# Patient Record
Sex: MALE | Race: WHITE | NOT HISPANIC OR LATINO | Employment: STUDENT | URBAN - METROPOLITAN AREA
[De-identification: names, ages, dates, MRNs, and addresses within clinical notes are randomized per-mention and may not be internally consistent; named-entity substitution may affect disease eponyms.]

---

## 2023-08-31 ENCOUNTER — OFFICE VISIT (OUTPATIENT)
Age: 23
End: 2023-08-31
Payer: COMMERCIAL

## 2023-08-31 DIAGNOSIS — T14.8XXA MUSCLE STRAIN: ICD-10-CM

## 2023-08-31 DIAGNOSIS — M62.81 MUSCLE WEAKNESS OF LEFT UPPER EXTREMITY: ICD-10-CM

## 2023-08-31 DIAGNOSIS — R29.3 POOR POSTURE: ICD-10-CM

## 2023-08-31 DIAGNOSIS — M25.512 ACUTE PAIN OF LEFT SHOULDER: Primary | ICD-10-CM

## 2023-08-31 PROCEDURE — 97110 THERAPEUTIC EXERCISES: CPT

## 2023-08-31 PROCEDURE — 97161 PT EVAL LOW COMPLEX 20 MIN: CPT

## 2023-08-31 PROCEDURE — 97530 THERAPEUTIC ACTIVITIES: CPT

## 2023-08-31 NOTE — PROGRESS NOTES
PT Evaluation     Today's date: 2023  Patient name: Ganesh Arvizu  : 2000  MRN: [id number]  Referring provider: Fraknlin Carter PT  Dx:   Encounter Diagnosis     ICD-10-CM    1. Acute pain of left shoulder  M25.512       2. Muscle strain  T14. 8XXA       3. Poor posture  R29.3       4. Muscle weakness of left upper extremity  M62.81           Start Time: 0945  Stop Time: 1045  Total time in clinic (min): 60 minutes    Assessment  Assessment details: Ganesh Arvizu is a 25y.o. year old male who presents to outpatient physical therapy with a primary diagnosis of acute left shoulder pain. 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 They present with the previously stated deficits which limit their ability to participate in recreational activities. Pt is a highly active young adult who is unable to participate in normal level of activity due to shoulder pain and weakness. He has no red flag symptoms or symptoms indicating need for radiographs. He does have very rounded shoulders, forward head and thoracic kyphosis at rest, which puts his scapula at a biomechanical disadvantage. He is highly motivated to return to PLOF. Anna Frost is currently functioning below their prior level of function and is a good rehab candidate who would benefit from skilled outpatient physical therapy services to allow them to reach their goals and return to PLOF, return to recreational activities, participate more fully in daily activities and have an improved quality of life. Physical therapist assistant (PTA) may be utilized to administer treatments as appropriate and in accordance with [address] [place] [address] [place].     Impairments: abnormal muscle tone, abnormal or restricted ROM, abnormal movement, activity intolerance, impaired physical strength, lacks appropriate home exercise program, pain with function and poor posture   Barriers to therapy: None   Understanding of Dx/Px/POC: good   Prognosis: good    Goals  STG to be completed in 2 weeks from 8/31/2023:   1. Starr Brunner will be independent with basic HEP to allow patient to complete exercises safely when not directly supervised during PT session.   2. Starr Brunner will report pain no worse than 5/10 at worst to indicate decreased pain level and improved tolerance to activity. 3. Starr Brunner will increase left shoulder AROM flexion and abduction to 170* with no increase in pain to allow patient to participate in functional tasks such as BADLs, overhead tasks, and functional mobility.         LTG to be completed in 5 weeks from 8/31/2023  or upon discharge from PT. 1. Starr Brunner will be independent with advanced home exercise program to allow patient to transition from physical therapy care to continuing with plan of care at home without supervision from therapist and continue to progress with rehabilitation. 2. Starr Brunner will report pain no worse than 2/10 at worst to indicate decreased pain level and improved tolerance to activity  3. Starr Brunner will demonstrate gross 4+/5 strength in left UE with no increase in pain for improved stability of joint and indicate improvement in functional strength.    4. Starr Brunner will report 75% improvement in symptoms compared to start of POC to indicate functional improvement and decrease level of disability.   5. Starr Brunner will return to sports with no limitations.        Plan  Patient would benefit from: skilled physical therapy  Planned modality interventions: biofeedback, cryotherapy, TENS and thermotherapy: hydrocollator packs  Planned therapy interventions: abdominal trunk stabilization, activity modification, ADL retraining, ADL training, balance, balance/weight bearing training, behavior modification, body mechanics training, breathing training, coordination, flexibility, functional ROM exercises, gait training, graded activity, graded exercise, graded motor, home exercise program, IADL retraining, IASTM, joint mobilization, kinesiology taping, manual therapy, massage, Holliday taping, muscle pump exercises, nerve gliding, patient education, postural training, strengthening, stretching, therapeutic activities and therapeutic exercise  Frequency: 2x week  Duration in weeks: 5  Plan of Care beginning date: 2023  Plan of Care expiration date: 10/5/2023  Treatment plan discussed with: patient, PTA and referring physician        Subjective Evaluation    History of Present Illness  Date of onset: 2023  Mechanism of injury: Stephan Vasquez is a 25 y.o. male. They present to outpatient physical therapy with the primary complaint of left shoulder pain. They are self-referred to OPPT. Symptoms began 2023. Amparo Chavez reports he went to make a tackle with his left shoulder. He had no pop or snap but just had pain. He was able to keep playing for a bit but then it felt weak. He cant sleep on the left side. He has not been using any ice, heat or medication. He did have surgery on right "key hole" for [facility] joint. He has his first rugby game this weekend.      Patient Goals  Patient goals for therapy: decreased pain, increased motion, increased strength and return to sport/leisure activities  Patient goal: "get back to playing Rugby, increase movement, reduce pain"   Pain  Current pain ratin  At best pain ratin  At worst pain ratin (lying in bed and rolling onto shoulder )  Location: left posterior shoulder and AC joint   Quality: dull ache  Relieving factors: rest  Exacerbated by: lying on left side, moving arm around     Social Support  Steps to enter house: yes  Stairs in house: yes   Lives in: multiple-level home  Lives with: roomates     Employment status: not working  Hand dominance: right  Exercise history: Joni Alemannison       Diagnostic Tests  No diagnostic tests performed  Treatments  No previous or current treatments        Objective     Static Posture     Head  Forward. Shoulders  Asymmetric shoulders and rounded. Palpation   Left   No palpable tenderness to the biceps, infraspinatus, latissimus, middle deltoid, middle trapezius, pectoralis minor, posterior deltoid, serratus anterior, supraspinatus, teres major, teres minor and triceps. Hypertonic in the middle trapezius. Active Range of Motion   Left Shoulder   Flexion: 110 degrees with pain  Extension: WFL  Abduction: 102 degrees with pain    Additional Active Range of Motion Details  Left Functional IR WNL non painful  Left Functional ER to mid C spine painful (mod limited compared to right)    Strength/Myotome Testing     Left Shoulder     Planes of Motion   Flexion: 3+ (pain )   Abduction: 4- (mild pain)   External rotation at 0°: 5   Internal rotation at 0°: 5     Isolated Muscles   Biceps: 5   Triceps: 5     Right Shoulder     Planes of Motion   Flexion: 5   Abduction: 5   External rotation at 0°: 5   Internal rotation at 0°: 5     Isolated Muscles   Biceps: 5   Triceps: 5     Tests     Left Shoulder   Positive empty can, full can and Hawkin's. Negative apprehension, belly press, crossover, drop arm, laxity (step off), lift-off, painful arc and sulcus sign. Flowsheet Rows    Flowsheet Row Most Recent Value   PT/OT G-Codes    Current Score 31   Projected Score 0             Precautions: standard,     Access Code: [email or web address]  URL: [email or web address]  Date: 08/31/2023  Prepared by: Palomo Alaniz    Exercises  - Seated Cervical Sidebending Stretch  - 2 x daily - 1 sets - 3 reps - 30 second hold  - Doorway Pec Stretch at 90 Degrees Abduction  - 2 x daily - 1 sets - 3 reps - 30 second hold  - Standing Row with Anchored Resistance  - 1 x daily - 1 sets - 20 reps - 3 second  hold  - Shoulder External Rotation with Anchored Resistance with Towel Under Elbow  - 2 x daily - 2 sets - 10 reps  - Shoulder Internal Rotation with Resistance - 2 x daily - 2 sets - 10 reps      Date: 8/31/2023             Visit: #1 #2 #3 #4 #5 #6 #7 #8 #9 #10   Manual:             t spine P-A  IASTM UT  PROM as needed L shoulder              Patient Education: Posture                                       Neuro Re-Ed             TB row             TB ext             TB IR -> at 90* abd              TB ER -> at 90* abd                                                     Ther Ex             Warm Up  UBE           Cat cow                                                                                            Ther Activity                                       Gait Training                                       Modalities

## 2023-09-04 NOTE — PROGRESS NOTES
Daily Note     Today's date: 2023  Patient name: Apple Grief  : 2000  MRN: [id number]  Referring provider: Palomo Alaniz PT  Dx:   Encounter Diagnosis     ICD-10-CM    1. Acute pain of left shoulder  M25.512       2. Muscle strain  T14. 8XXA       3. Poor posture  R29.3       4. Muscle weakness of left upper extremity  M62.81           Start Time: 0730  Stop Time: 0815  Total time in clinic (min): 45 minutes    Subjective: Pt reports shoulder is feeling ok today. He did some training last night so was a little tight. He is not playing in the match this weekend. Overall shoulder is improving. Objective: See treatment diary below      Assessment: Apple Grdelfina tolerated today's treatment session well. Patient education provided on progression of stability shoulder TE and postural correction exercises. Misti Vang completed all TE with good form and no adverse reactions. Pt does have hypomubile segments in thoracic spine which improved with grade 3-4 mobilizations. He was challanged with table top position exercises. Misti Vang continues to benefit from skilled OPPT services to address left shoulder pain. Will continue to address functional deficits and focus on progression of POC per patient tolerance. Patient performed UBE to increase blood flow to the area being treated, prepare the muscles for strength training and stretching, improve overall tolerance to activity, and aerobic endurance. Plan: Continue per plan of care. Progress treatment as tolerated. Precautions: standard,     Access Code: [email or web address]  URL: [email or web address]  Date: 2023  Prepared by: Palomo Alaniz    Exercises  - Seated Cervical Sidebending Stretch  - 2 x daily - 1 sets - 3 reps - 30 second hold  - Doorway Pec Stretch at 90 Degrees Abduction  - 2 x daily - 1 sets - 3 reps - 30 second hold  - Standing Row with Anchored Resistance  - 1 x daily - 1 sets - 20 reps - 3 second  hold  - Shoulder External Rotation with Anchored Resistance with Towel Under Elbow  - 2 x daily - 2 sets - 10 reps  - Shoulder Internal Rotation with Resistance  - 2 x daily - 2 sets - 10 reps  - Supine Scapular Protraction  - 2 x daily - 1 sets - 20 reps  - Wall Push Up with Plus  - 2 x daily - 1 sets - 10-20 reps  - Cat Cow  - 2 x daily - 1 sets - 10-20 reps  - Shoulder Internal Rotation in Abduction with Resistance  - 2 x daily - 1 sets - 10-20 reps  - Standing Single Arm Shoulder External Rotation in Abduction with Anchored Resistance  - 2 x daily - 1 sets - 10-20 reps  - Quadruped Alternating Arm Lift  - 2 x daily - 1 sets - 10-20 reps      Date: 8/31/2023 9/5/2023           Visit: #1 #2 #3 #4 #5 #6 #7 #8 #9 #10   Manual:             t spine P-A  IASTM UT  PROM as needed L shoulder   8 min [address] [facility]            Patient Education: Posture                                       Neuro Re-Ed             TB row  GTB x20            TB ext  GTB x20            TB IR -> at 90* abd   At 90* abd x20 RTB           TB ER -> at 90* abd   At 90* abd x20 RTB           Ball on wall   2# ball 4 directions x20 each            Body blade  2x30"                        Ther Ex             Warm Up  UBE level 1 4'/4'           Cat cow   x15            Quadruped alt UE  x10            Supine serratus punch   x20 each UE            Wall push up puls  x10           Doorway pec stretch   30" x3           TRX Row  x20                         Ther Activity                                       Gait Training                                       Modalities

## 2023-09-05 ENCOUNTER — OFFICE VISIT (OUTPATIENT)
Age: 23
End: 2023-09-05
Payer: COMMERCIAL

## 2023-09-05 DIAGNOSIS — M25.512 ACUTE PAIN OF LEFT SHOULDER: Primary | ICD-10-CM

## 2023-09-05 DIAGNOSIS — M62.81 MUSCLE WEAKNESS OF LEFT UPPER EXTREMITY: ICD-10-CM

## 2023-09-05 DIAGNOSIS — R29.3 POOR POSTURE: ICD-10-CM

## 2023-09-05 DIAGNOSIS — T14.8XXA MUSCLE STRAIN: ICD-10-CM

## 2023-09-05 PROCEDURE — 97110 THERAPEUTIC EXERCISES: CPT

## 2023-09-05 PROCEDURE — 97140 MANUAL THERAPY 1/> REGIONS: CPT

## 2023-09-05 PROCEDURE — 97112 NEUROMUSCULAR REEDUCATION: CPT

## 2023-09-12 ENCOUNTER — OFFICE VISIT (OUTPATIENT)
Age: 23
End: 2023-09-12
Payer: COMMERCIAL

## 2023-09-12 DIAGNOSIS — M62.81 MUSCLE WEAKNESS OF LEFT UPPER EXTREMITY: ICD-10-CM

## 2023-09-12 DIAGNOSIS — R29.3 POOR POSTURE: ICD-10-CM

## 2023-09-12 DIAGNOSIS — T14.8XXA MUSCLE STRAIN: ICD-10-CM

## 2023-09-12 DIAGNOSIS — M25.512 ACUTE PAIN OF LEFT SHOULDER: Primary | ICD-10-CM

## 2023-09-12 PROCEDURE — 97112 NEUROMUSCULAR REEDUCATION: CPT

## 2023-09-12 PROCEDURE — 97110 THERAPEUTIC EXERCISES: CPT

## 2023-09-13 NOTE — PROGRESS NOTES
Daily Note     Today's date: 2023  Patient name: Kimmie Sultana  : 2000  MRN: [id number]  Referring provider: Vladimir Perez, PT  Dx:   Encounter Diagnosis     ICD-10-CM    1. Acute pain of left shoulder  M25.512       2. Muscle weakness of left upper extremity  M62.81       3. Muscle strain  T14. 8XXA       4. Poor posture  R29.3                      Subjective: ***      Objective: See treatment diary below      Assessment: Kimmie Sultana tolerated today's treatment session well. Patient education provided on [facility] education options:[id number]}. Jacob Jonas completed all TE with good form and no adverse reactions. ***  Jacob Jonas continues to benefit from skilled OPPT services to address {daMer[facility] Medical Centersytoms:76487}. Will continue to address functional deficits and focus on progression of POC per patient tolerance. Patient performed {KUAerobic:45140} to increase blood flow to the area being treated, prepare the muscles for strength training and stretching, improve overall tolerance to activity, and aerobic endurance. Plan: Continue per plan of care. Progress treatment as tolerated. Precautions: standard,     Access Code: [email or web address]  URL: [email or web address]  Date: 2023  Prepared by: Vladimir Perez    Exercises  - Seated Cervical Sidebending Stretch  - 2 x daily - 1 sets - 3 reps - 30 second hold  - Doorway Pec Stretch at 90 Degrees Abduction  - 2 x daily - 1 sets - 3 reps - 30 second hold  - Standing Row with Anchored Resistance  - 1 x daily - 1 sets - 20 reps - 3 second  hold  - Shoulder External Rotation with Anchored Resistance with Towel Under Elbow  - 2 x daily - 2 sets - 10 reps  - Shoulder Internal Rotation with Resistance  - 2 x daily - 2 sets - 10 reps  - Supine Scapular Protraction  - 2 x daily - 1 sets - 20 reps  - Wall Push Up with Plus  - 2 x daily - 1 sets - 10-20 reps  - Cat Cow  - 2 x daily - 1 sets - 10-20 reps  - Shoulder Internal Rotation in Abduction with Resistance  - 2 x daily - 1 sets - 10-20 reps  - Standing Single Arm Shoulder External Rotation in Abduction with Anchored Resistance  - 2 x daily - 1 sets - 10-20 reps  - Quadruped Alternating Arm Lift  - 2 x daily - 1 sets - 10-20 reps      Date: 8/31/2023  9/5/2023 9/12/2023 9/15/2023         Visit: #1 #2 #3 #4 #5 #6 #7 #8 #9 #10   Manual:             t spine P-A  IASTM UT  PROM as needed L shoulder   8 min KH  Theragun to UTs 5 min           Patient Education: Posture                                       Neuro Re-Ed             TB row  GTB x20  keiko 15 PSI x20          TB ext  GTB x20            TB IR -> at 90* abd   At 90* abd x20 RTB           TB ER -> at 90* abd   At 90* abd x20 RTB           Ball on wall   2# ball 4 directions x20 each            Body blade  2x30"           PNF   4PSI [place] x20          Overhead ball toss    Red ball 1 min           Wall walk    Pink TB x10                                                 Ther Ex             Warm Up  UBE level 1 4'/4' UBE level 3 4'/4'          Cat cow   x15            Quadruped alt UE  x10  In high plank n table x10 each arm           Supine serratus punch   x20 each UE  3# weights x20 BUE           Wall push up puls  x10           Doorway pec stretch   30" x3 30" x3           TRX Row  x20            Prone I, T, Y   T x20  Y x20  I x20          No money    GTB x20                                    Ther Activity                                       Gait Training                                       Modalities

## 2023-09-15 ENCOUNTER — APPOINTMENT (OUTPATIENT)
Age: 23
End: 2023-09-15
Payer: COMMERCIAL

## 2023-09-15 DIAGNOSIS — M62.81 MUSCLE WEAKNESS OF LEFT UPPER EXTREMITY: ICD-10-CM

## 2023-09-15 DIAGNOSIS — R29.3 POOR POSTURE: ICD-10-CM

## 2023-09-15 DIAGNOSIS — M25.512 ACUTE PAIN OF LEFT SHOULDER: Primary | ICD-10-CM

## 2023-09-15 DIAGNOSIS — T14.8XXA MUSCLE STRAIN: ICD-10-CM

## 2023-09-15 NOTE — PROGRESS NOTES
Daily Note     Today's date: 2023  Patient name: Ishan Garrison  : 2000  MRN: [id number]  Referring provider: Andrew Horne, PT  Dx:   Encounter Diagnosis     ICD-10-CM    1. Acute pain of left shoulder  M25.512       2. Muscle strain  T14. 8XXA       3. Poor posture  R29.3       4. Muscle weakness of left upper extremity  M62.81           Start Time: 0900  Stop Time: 45  Total time in clinic (min): 45 minutes    Subjective: Pt reports he has been practicing with  team and is able to do most things with minimal pain. Overall feels he is improving. Objective: See treatment diary below      Assessment: Ishan Garrison tolerated today's treatment session well. Patient education provided on progression of shoulder strengthening exercises and HEP. Ava Ponce completed all TE with good form and no adverse reactions. Pt was challenged with WBing exercises in high plank position, most notable with push up plus. He was provided an updated progressed HEP and blue and purple bands for continued progression at home. Ava Ponce continues to benefit from skilled OPPT services to address shoulder pain. Will continue to address functional deficits and focus on progression of POC per patient tolerance. Patient performed UBE to increase blood flow to the area being treated, prepare the muscles for strength training and stretching, improve overall tolerance to activity, and aerobic endurance. Plan: Continue per plan of care. Progress treatment as tolerated. Precautions: standard,     Access Code: [email or web address]  URL: [email or web address]  Date: 2023  Prepared by: Andrew Horne    Exercises  - Seated Cervical Sidebending Stretch  - 2 x daily - 1 sets - 3 reps - 30 second hold  - Doorway Pec Stretch at 90 Degrees Abduction  - 2 x daily - 1 sets - 3 reps - 30 second hold  - Standing Row with Anchored Resistance  - 1 x daily - 1 sets - 20 reps - 3 second  hold  - Shoulder External Rotation with Anchored Resistance with Towel Under Elbow  - 2 x daily - 2 sets - 10 reps  - Shoulder Internal Rotation with Resistance  - 2 x daily - 2 sets - 10 reps  - Supine Scapular Protraction  - 2 x daily - 1 sets - 20 reps  - Wall Push Up with Plus  - 2 x daily - 1 sets - 10-20 reps  - Cat Cow  - 2 x daily - 1 sets - 10-20 reps  - Shoulder Internal Rotation in Abduction with Resistance  - 2 x daily - 1 sets - 10-20 reps  - Standing Single Arm Shoulder External Rotation in Abduction with Anchored Resistance  - 2 x daily - 1 sets - 10-20 reps  - Quadruped Alternating Arm Lift  - 2 x daily - 1 sets - 10-20 reps  - Prone Scapular Retraction Arms at Side  - 2 x daily - 2 sets - 10 reps  - Prone Single Arm Shoulder Y  - 2 x daily - 2 sets - 10 reps  - Modified Superman on Table  - 2 x daily - 2 sets - 10 reps  - Face Pulls  - 2 x daily - 2 sets - 10 reps  - No Money's   - 2 x daily - 2 sets - 10 reps  - Plank  - 2 x daily - 2 sets - 3 reps - 30-60 second  hold  - Shoulder PNF D2 with Resistance  - 2 x daily - 2 sets - 10 reps  - Push Up with Plus  - 2 x daily - 2 sets - 10 reps      Date: 8/31/2023  9/5/2023 9/12/2023 9/15/2023         Visit: #1 #2 #3 #4 #5 #6 #7 #8 #9 #10   Manual:             t spine P-A  IASTM UT  PROM as needed L shoulder   8 min KH  Theragun to UTs 5 min           Patient Education: Posture                                       Neuro Re-Ed             TB row  GTB x20  keiko 15 PSI x20          TB ext  GTB x20            TB IR -> at 90* abd   At 90* abd x20 RTB           TB ER -> at 90* abd   At 90* abd x20 RTB           Ball on wall   2# ball 4 directions x20 each   2# ball 4 directions x20 each          Body blade  2x30"           PNF   4PSI [place] x20 Pink TB x15         Overhead ball toss    Red ball 1 min  Red ball 1 min          Wall walk    Pink TB x10 Pink TB x10         High plank     30"  Step ups to 6" box x10         Full push up plus     x10                      Ther Ex Warm Up  UBE level 1 4'/4' UBE level 3 4'/4' UBE level 2 4'/4'         Cat cow   x15            Quadruped alt UE  x10  In high plank n table x10 each arm           Supine serratus punch   x20 each UE  3# weights x20 BUE  Standing CO Yellow 10# x20          Wall push up puls  x10           Doorway pec stretch   30" x3 30" x3           TRX Row  x20            Prone I, T, Y   T x20  Y x20  I x20 T x20  Y x20  I x20         No money    GTB x20 GTB x20          Face pulls    CO Yellow 10# x20                       Ther Activity                                       Gait Training                                       Modalities

## 2023-09-20 ENCOUNTER — OFFICE VISIT (OUTPATIENT)
Age: 23
End: 2023-09-20
Payer: COMMERCIAL

## 2023-09-20 DIAGNOSIS — M25.512 ACUTE PAIN OF LEFT SHOULDER: Primary | ICD-10-CM

## 2023-09-20 DIAGNOSIS — R29.3 POOR POSTURE: ICD-10-CM

## 2023-09-20 DIAGNOSIS — M62.81 MUSCLE WEAKNESS OF LEFT UPPER EXTREMITY: ICD-10-CM

## 2023-09-20 DIAGNOSIS — T14.8XXA MUSCLE STRAIN: ICD-10-CM

## 2023-09-20 PROCEDURE — 97110 THERAPEUTIC EXERCISES: CPT

## 2023-09-20 PROCEDURE — 97112 NEUROMUSCULAR REEDUCATION: CPT

## 2024-10-28 NOTE — PROGRESS NOTES
Daily Note     Today's date: 2023  Patient name: Michelet Wilks  : 2000  MRN: [id number]  Referring provider: Armin Barajas, PT  Dx:   Encounter Diagnosis     ICD-10-CM    1. Acute pain of left shoulder  M25.512       2. Muscle strain  T14. 8XXA       3. Poor posture  R29.3       4. Muscle weakness of left upper extremity  M62.81           Start Time: 1200  Stop Time: 1245  Total time in clinic (min): 45 minutes    Subjective: Pt reports that he was at practice he tried to lift a teammate a drill and then had pain in shoulder. Objective: See treatment diary below      Assessment: Michelet Wilks tolerated today's treatment session well. Patient education provided on progression of POC and importance of pain free strengtening and postural control/awareness. Estefania Gustafson completed all TE with good form and no adverse reactions. Patient was challenged with prone I, T, Y exercises as well as wall walks. He admits he does not feel ready to return to playing rugby for game this coming week. Estefania Gustafson continues to benefit from skilled OPPT services to address shoulder pain. Will continue to address functional deficits and focus on progression of POC per patient tolerance. Patient performed UBE to increase blood flow to the area being treated, prepare the muscles for strength training and stretching, improve overall tolerance to activity, and aerobic endurance. Plan: Continue per plan of care. Progress treatment as tolerated. Precautions: standard,     Access Code: [email or web address]  URL: [email or web address]  Date: 2023  Prepared by: Armin Barajas    Exercises  - Seated Cervical Sidebending Stretch  - 2 x daily - 1 sets - 3 reps - 30 second hold  - Doorway Pec Stretch at 90 Degrees Abduction  - 2 x daily - 1 sets - 3 reps - 30 second hold  - Standing Row with Anchored Resistance  - 1 x daily - 1 sets - 20 reps - 3 second  hold  - Shoulder External Rotation with Anchored Resistance with Towel Under Elbow  - 2 x daily - 2 sets - 10 reps  - Shoulder Internal Rotation with Resistance  - 2 x daily - 2 sets - 10 reps  - Supine Scapular Protraction  - 2 x daily - 1 sets - 20 reps  - Wall Push Up with Plus  - 2 x daily - 1 sets - 10-20 reps  - Cat Cow  - 2 x daily - 1 sets - 10-20 reps  - Shoulder Internal Rotation in Abduction with Resistance  - 2 x daily - 1 sets - 10-20 reps  - Standing Single Arm Shoulder External Rotation in Abduction with Anchored Resistance  - 2 x daily - 1 sets - 10-20 reps  - Quadruped Alternating Arm Lift  - 2 x daily - 1 sets - 10-20 reps      Date: 8/31/2023 9/5/2023 9/12/2023          Visit: #1 #2 #3 #4 #5 #6 #7 #8 #9 #10   Manual:             t spine P-A  IASTM UT  PROM as needed L shoulder   8 min KH  Theragun to UTs 5 min           Patient Education: Posture                                       Neuro Re-Ed             TB row  GTB x20  patel 15 PSI x20          TB ext  GTB x20            TB IR -> at 90* abd   At 90* abd x20 RTB           TB ER -> at 90* abd   At 90* abd x20 RTB           Ball on wall   2# ball 4 directions x20 each            Body blade  2x30"           PNF   4PSI [place] x20          Overhead ball toss    Red ball 1 min           Wall walk    Pink TB x10                                                 Ther Ex             Warm Up  UBE level 1 4'/4' UBE level 3 4'/4'          Cat cow   x15            Quadruped alt UE  x10  In high plank n table x10 each arm           Supine serratus punch   x20 each UE  3# weights x20 BUE           Wall push up puls  x10           Doorway pec stretch   30" x3 30" x3           TRX Row  x20            Prone I, T, Y   T x20  Y x20  I x20          No money    GTB x20                                    Ther Activity                                       Gait Training                                       Modalities 116

## 2024-11-12 ENCOUNTER — ATHLETIC TRAINING (OUTPATIENT)
Dept: SPORTS MEDICINE | Facility: OTHER | Age: 24
End: 2024-11-12

## 2024-11-12 ENCOUNTER — APPOINTMENT (OUTPATIENT)
Age: 24
End: 2024-11-12
Payer: COMMERCIAL

## 2024-11-12 ENCOUNTER — OFFICE VISIT (OUTPATIENT)
Age: 24
End: 2024-11-12
Payer: COMMERCIAL

## 2024-11-12 VITALS
OXYGEN SATURATION: 98 % | DIASTOLIC BLOOD PRESSURE: 80 MMHG | WEIGHT: 212.08 LBS | BODY MASS INDEX: 25.83 KG/M2 | RESPIRATION RATE: 17 BRPM | HEART RATE: 61 BPM | HEIGHT: 76 IN | SYSTOLIC BLOOD PRESSURE: 122 MMHG | TEMPERATURE: 98.7 F

## 2024-11-12 DIAGNOSIS — S66.812A STRAIN OF MCP JOINT, LEFT, INITIAL ENCOUNTER: Primary | ICD-10-CM

## 2024-11-12 DIAGNOSIS — M79.642 PAIN OF LEFT HAND: ICD-10-CM

## 2024-11-12 DIAGNOSIS — S63.682A SPRAIN OF OTHER SITE OF LEFT THUMB, INITIAL ENCOUNTER: Primary | ICD-10-CM

## 2024-11-12 PROCEDURE — 73130 X-RAY EXAM OF HAND: CPT

## 2024-11-12 PROCEDURE — S9083 URGENT CARE CENTER GLOBAL: HCPCS

## 2024-11-12 PROCEDURE — G0382 LEV 3 HOSP TYPE B ED VISIT: HCPCS

## 2024-11-12 NOTE — PATIENT INSTRUCTIONS
Preliminary reading of left hand X-ray: No acute fracture on my interpretation.  Radiologist will have final reading- if that is different I will call you.    Heat to affected area: 15 minutes every 3 hours as needed throughout the day  Topical pain medication such as icy/hot, Biofreeze, Salon pas, etc.  Ibuprofen - 600 mg orally every 6-8 hours when required, maximum 2400 mg/day   Acetaminophen - 650 mg orally every 4-6 hours when required, maximum 3000 mg/day    Since pain has been ongoing for 3 weeks, will have you follow up with orthopedics- Call [phone] to make an appointment    If tests have been performed at [facility], our office will contact you with results if changes need to be made to the care plan discussed with you at the visit.  You can review your full results on [facility] MyChart.

## 2024-11-12 NOTE — PROGRESS NOTES
[facility]        NAME: Derick Osullivan is a 23 y.o. male  : 2000    MRN: [id number]  DATE: 2024  TIME: 11:02 AM    Assessment and Plan   Sprain of other site of left thumb, initial encounter [S63.682A]  1. Sprain of other site of left thumb, initial encounter  XR hand 3+ vw left    Ambulatory Referral to Orthopedic Surgery            Patient Instructions   Preliminary reading of left hand X-ray: No acute fracture on my interpretation.  Radiologist will have final reading- if that is different I will call you.    Heat to affected area: 15 minutes every 3 hours as needed throughout the day  Topical pain medication such as icy/hot, Biofreeze, Salon pas, etc.  Ibuprofen - 600 mg orally every 6-8 hours when required, maximum 2400 mg/day   Acetaminophen - 650 mg orally every 4-6 hours when required, maximum 3000 mg/day    Since pain has been ongoing for 3 weeks, will have you follow up with orthopedics- Call [phone] to make an appointment    If tests have been performed at [facility], our office will contact you with results if changes need to be made to the care plan discussed with you at the visit.  You can review your full results on [facility] MyChart.    Chief Complaint     Chief Complaint   Patient presents with    Hand Injury     Plays rugby at [employer]. Had injury to LEFT thumb and hand about 3 weeks ago. Sent here by AT for x-ray of hand.          History of Present Illness       Pain at the base of the left thumb starting about 3 weeks ago.  States he does play rugby and unsure of any specific injury that had started this.  States pain with movement such as flexion or extension of the left thumb.  He does report there is pain on palpation to the first metacarpal of the left hand.  He has been icing and avoiding movements that can make the pain worse.    Hand Injury   Pertinent negatives include no chest pain.       Review of Systems   Review of Systems   Constitutional:   "Negative for chills and fever.   Eyes:  Negative for pain and visual disturbance.   Respiratory:  Negative for cough and shortness of breath.    Cardiovascular:  Negative for chest pain and palpitations.   Gastrointestinal:  Negative for abdominal pain and vomiting.   Musculoskeletal:  Positive for myalgias. Negative for arthralgias, back pain and joint swelling.        Base of left thumb   Skin:  Negative for color change and rash.   Neurological:  Negative for dizziness, seizures, syncope, weakness and light-headedness.   All other systems reviewed and are negative.        Current Medications     No current outpatient medications on file.    Current Allergies     Allergies as of 11/12/2024    (No Known Allergies)            The following portions of the patient's history were reviewed and updated as appropriate: allergies, current medications, past family history, past medical history, past social history, past surgical history and problem list.     History reviewed. No pertinent past medical history.    Past Surgical History:   Procedure Laterality Date    SHOULDER SURGERY Right     THROAT SURGERY      espohageal fistula repair       History reviewed. No pertinent family history.      Medications have been verified.        Objective   /80 (BP Location: Left arm, Patient Position: Sitting, Cuff Size: Standard)   Pulse 61   Temp 98.7 °F (37.1 °C) (Oral)   Resp 17   Ht 6' 4\" (1.93 m)   Wt 96.2 kg (212 lb 1.3 oz)   SpO2 98%   BMI 25.82 kg/m²   No LMP for male patient.       Physical Exam     Physical Exam  Vitals and nursing note reviewed.   Constitutional:       Appearance: Normal appearance.   HENT:      Head: Normocephalic and atraumatic.   Pulmonary:      Effort: Pulmonary effort is normal.   Musculoskeletal:        Hands:    Skin:     General: Skin is warm and dry.      Capillary Refill: Capillary refill takes less than 2 seconds.   Neurological:      General: No focal deficit present.      Mental " Status: He is alert and oriented to person, place, and time. Mental status is at baseline.      Sensory: No sensory deficit.      Motor: No weakness.   Psychiatric:         Mood and Affect: Mood normal.         Behavior: Behavior normal.         Thought Content: Thought content normal.

## 2024-11-14 ENCOUNTER — OFFICE VISIT (OUTPATIENT)
Age: 24
End: 2024-11-14
Payer: COMMERCIAL

## 2024-11-14 VITALS
BODY MASS INDEX: 25.94 KG/M2 | WEIGHT: 213 LBS | SYSTOLIC BLOOD PRESSURE: 122 MMHG | HEIGHT: 76 IN | HEART RATE: 60 BPM | DIASTOLIC BLOOD PRESSURE: 80 MMHG

## 2024-11-14 DIAGNOSIS — S69.92XA INJURY OF LEFT THUMB, INITIAL ENCOUNTER: ICD-10-CM

## 2024-11-14 DIAGNOSIS — S66.412A STRAIN OF INTRINSIC MUSCLE OF LEFT THUMB: ICD-10-CM

## 2024-11-14 DIAGNOSIS — S63.642A SPRAIN OF METACARPOPHALANGEAL (MCP) JOINT OF LEFT THUMB, INITIAL ENCOUNTER: Primary | ICD-10-CM

## 2024-11-14 PROCEDURE — 99203 OFFICE O/P NEW LOW 30 MIN: CPT | Performed by: STUDENT IN AN ORGANIZED HEALTH CARE EDUCATION/TRAINING PROGRAM

## 2024-11-14 NOTE — PROGRESS NOTES
1. Sprain of metacarpophalangeal (MCP) joint of left thumb, initial encounter  Durable Medical Equipment      2. Injury of left thumb, initial encounter  Durable Medical Equipment        Orders Placed This Encounter   Procedures    Durable Medical Equipment        Imaging Studies (I personally reviewed images in PACS and report):    X-ray left hand 11/12/2024: No acute osseous abnormalities.  No significant degenerative changes.  Tiny punctate radiodensity along radial aspect of the second PIP only seen on AP view and similar radiodensity of the distal third digit on the lateral.  This is felt to be artifact.    IMPRESSION:  Acute left thumb MCP pain/injury of an unspecified mechanism-patient participates in rugby and thinks something occurred during the game as he noticed afterwards he was experiencing severe pain at the MCP aspect of his thumb as well as diffuse swelling  Since incident patient reports difficulty with gripping, lifting strenuously.  Pain is currently aggravated during rugby along with swelling afterwards.  Radiographic imaging unremarkable  Suspected MCP sprain of his thumb either of the UCL or RCL.  Differential includes myofascial strain around the MCP of his thumb as well  Date of Injury: Approximately 3 weeks ago    School: [employer]  Sport: Rugby    PLAN:    Clinical exam and radiographic imaging reviewed with patient today, with impression as per above. I have discussed with the patient the pathophysiology of this diagnosis and reviewed how the examination correlates with this diagnosis.    Prior imaging reviewed as noted above.  Recommended conservative treatment at this time.  I counseled an MCP sprain about the UCL or RCL can take up to 6 weeks to heal.  I did recommend immobilizing his thumb in a thumb spica brace at all times other than for hygienic purposes.  Regards to participating in rugby, I counseled that he can add extra padding to the thumb spica brace and participate  "as tolerated.  In regards to pain control, I counseled as to use of acetaminophen, NSAIDs, heat/ice therapy.  I counseled if symptoms do not progressively improve over time with these interventions, we can always consider obtaining an MRI of his thumb for further evaluation to confirm other myotendinous injury/causes.    Return in about 4 weeks (around 12/12/2024) for Follow up left thumb.    Portions of the record may have been created with voice recognition software. Occasional wrong word or \"sound a like\" substitutions may have occurred due to the inherent limitations of voice recognition software. Read the chart carefully and recognize, using context, where substitutions have occurred.     CHIEF COMPLAINT:  Chief Complaint   Patient presents with    Left Hand - Pain     It happened about three wks ago after a rugby pain when lifting when pressed upon. He was having a lot issues w/ it It's soft tissue damaged. The xray says no Fx.         HPI:  Derick Osullivan is a 23 y.o. male  who presents for       Visit 11/14/2024 :  Initial evaluation of left basal thumb pain/injury:  Of note patient attends [employer] and participates in rugby  Precipitating injury about 3 weeks ago during a rugby match.  He is unsure the mechanism of injury specifically.  He just noticed at the end of the game he had severe worsening pain along the base of his thumb and generalized swelling of his thumb and hand.  Denies any bruising.  At the time patient treated conservatively with icing and resting which did alleviate the swelling.  However he feels that he recurrently aggravates his thumb MCP with participating in rugby as well as when he is lifting, gripping strenuously.  He reports a sense of weakness with his .  Denies any N/T of his left hand.  Denies prior surgeries of his left hand or similar injuries in the past of his left hand or thumb.    He did go to urgent care for this issue on 11/12/2024 and had imaging done " "as noted above.  Images were unremarkable for fracture.    Patient reports today that he does not have the swelling but he still reports pain and states it is localized around his MCP joint.  Describes as a sharp pain.  Reports recurrent swelling of his thumb with use of his hand and still feels that his  strength is weak compared to his contralateral side      Medical, Surgical, Family, and Social History    History reviewed. No pertinent past medical history.  Past Surgical History:   Procedure Laterality Date    SHOULDER SURGERY Right     THROAT SURGERY      espohageal fistula repair     Social History   Social History     Substance and Sexual Activity   Alcohol Use Yes    Comment: socially     Social History     Substance and Sexual Activity   Drug Use Not Currently     Social History     Tobacco Use   Smoking Status Never    Passive exposure: Never   Smokeless Tobacco Never     History reviewed. No pertinent family history.  No Known Allergies       Physical Exam  /80   Pulse 60   Ht 6' 4\" (1.93 m)   Wt 96.6 kg (213 lb)   BMI 25.93 kg/m²     Constitutional:  see vital signs  Gen: well-developed, normocephalic/atraumatic, well-groomed  Eyes: No inflammation or discharge of conjunctiva or lids; sclera clear   Pharynx: no inflammation, lesion, or mass of lips  Neck: supple, no masses, non-distended  MSK: no inflammation, lesion, mass, or clubbing of nails and digits except for other than mentioned below  SKIN: no visible rashes or skin lesions  Pulmonary/Chest: Effort normal. No respiratory distress.     Ortho Exam  Left hand exam:  Inspection: No edema, erythema, ecchymosis, open wounds or drainage.  Palpation: Mild tenderness along the medial aspect of the MCP but tolerable per patient.  Nontender throughout the rest of his hand or radial/ulnar aspect of wrist.  ROM: Intact ROM of the IP and MCP of his thumb with reported mild pain at MCP joint.  He is able to oppose his thumb to his other " digits.  Strength: Resisted thumb extension 5 -/5 in aggravates thumb MCP pain.  Resisted thumb abduction 5/5 without pain.  Resisted thumb adduction 5 -/5 in aggravates thumb MCP pain.  Valgus stress testing at thumb MCP: No significant laxity compared to contralateral side.  No exacerbation of pain per patient at full extension and with 30 degrees of flexion.  Varus stress testing at thumb MCP: No significant laxity compared to contralateral side.  No exacerbation of pain per patient  Finkelstein's test: Equivocal as it does aggravate pain in his MCP joint    Procedures

## 2024-12-03 NOTE — PROGRESS NOTES
Athletic Training Evaluation    Name: Derick Osullivan  Age: 24 y.o.   School District: [employer]   Sport: rugby  Date of Assessment: 11/12/2024    Assessment/Plan:     Visit Diagnosis: Thenar eminence strain/ligament strain of the MCP joint    Treatment Plan: Ice, rehab, tape, rest    []  Follow-up PRN.   []  Follow-up prior to next practice/game for re-evaluation.  []  Daily treatment/rehab. Progress note expected weekly.     Referral:     []  Not needed at this time  [x]  Referred to: [facility]    []  Coaching staff notified  []  Parent/Guardian Notified    Subjective: Athlete has been seen during games for thumb pain. He expressed his intentions to keep playing like most players do. We taped athlete for support and restriction of thumb movements. He came into the clinic to get a better look at his thumb since it has been going on for 3 weeks now. He states his thumb does not bother him at rest 1/10 but certain movements he feels a shocking 8/10 pain. Examples for certain things were grabbing or holding a towel between hands. Athlete could not identify mechanism of injury only assumed it was from a tackle. Athlete has been icing often to help with recurrent swelling.    Objective: Visual inspection revealed swelling throughout finger/ thenar eminence. No discoloration or deformity seen. Palpable tenderness along thenar eminence and slight discomfort throughout metacarpal phalangeal joint. Range of motion within normal limits. Muscular strength weakened 4/5. Loading test negative for increase in pain, tuning fork negative for pain, valgus/varus stress test negative (inconsistent reproduction of symptoms), forces hyperextension positive for being uncomfortable.      Athlete has been seen during games to be taped and treated post game. He was told at the last game to come in to see be for a more comprehensive evaluation and to expect to be referred to [facility] for potential imaging since injury has  not gotten better.  Athlete was continuously instructed on having support and reducing any stress on injury when possible.